# Patient Record
Sex: FEMALE | Race: WHITE | NOT HISPANIC OR LATINO
[De-identification: names, ages, dates, MRNs, and addresses within clinical notes are randomized per-mention and may not be internally consistent; named-entity substitution may affect disease eponyms.]

---
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
## 2024-12-13 ENCOUNTER — INPATIENT (INPATIENT)
Facility: HOSPITAL | Age: 19
LOS: 1 days | Discharge: HOME | DRG: 951 | End: 2024-12-15
Attending: OBSTETRICS & GYNECOLOGY | Admitting: OBSTETRICS & GYNECOLOGY
Payer: MEDICAID

## 2024-12-13 ENCOUNTER — TRANSCRIPTION ENCOUNTER (OUTPATIENT)
Age: 19
End: 2024-12-13

## 2024-12-13 VITALS — OXYGEN SATURATION: 99 % | HEART RATE: 93 BPM

## 2024-12-13 DIAGNOSIS — O26.899 OTHER SPECIFIED PREGNANCY RELATED CONDITIONS, UNSPECIFIED TRIMESTER: ICD-10-CM

## 2024-12-13 DIAGNOSIS — Z3A.39 39 WEEKS GESTATION OF PREGNANCY: ICD-10-CM

## 2024-12-13 DIAGNOSIS — Z34.80 ENCOUNTER FOR SUPERVISION OF OTHER NORMAL PREGNANCY, UNSPECIFIED TRIMESTER: ICD-10-CM

## 2024-12-13 LAB
ALBUMIN SERPL ELPH-MCNC: 3.6 G/DL — SIGNIFICANT CHANGE UP (ref 3.3–5)
ALP SERPL-CCNC: 252 U/L — HIGH (ref 40–120)
ALT FLD-CCNC: 11 U/L — SIGNIFICANT CHANGE UP (ref 10–45)
ANION GAP SERPL CALC-SCNC: 14 MMOL/L — SIGNIFICANT CHANGE UP (ref 5–17)
APPEARANCE UR: CLEAR — SIGNIFICANT CHANGE UP
AST SERPL-CCNC: 17 U/L — SIGNIFICANT CHANGE UP (ref 10–40)
BASOPHILS # BLD AUTO: 0.05 K/UL — SIGNIFICANT CHANGE UP (ref 0–0.2)
BASOPHILS NFR BLD AUTO: 0.7 % — SIGNIFICANT CHANGE UP (ref 0–2)
BILIRUB SERPL-MCNC: 0.1 MG/DL — LOW (ref 0.2–1.2)
BILIRUB UR-MCNC: NEGATIVE — SIGNIFICANT CHANGE UP
BLD GP AB SCN SERPL QL: NEGATIVE — SIGNIFICANT CHANGE UP
BUN SERPL-MCNC: 14 MG/DL — SIGNIFICANT CHANGE UP (ref 7–23)
CALCIUM SERPL-MCNC: 9.3 MG/DL — SIGNIFICANT CHANGE UP (ref 8.4–10.5)
CHLORIDE SERPL-SCNC: 104 MMOL/L — SIGNIFICANT CHANGE UP (ref 96–108)
CO2 SERPL-SCNC: 19 MMOL/L — LOW (ref 22–31)
COLOR SPEC: YELLOW — SIGNIFICANT CHANGE UP
CREAT ?TM UR-MCNC: 28 MG/DL — SIGNIFICANT CHANGE UP
CREAT SERPL-MCNC: 0.63 MG/DL — SIGNIFICANT CHANGE UP (ref 0.5–1.3)
DIFF PNL FLD: NEGATIVE — SIGNIFICANT CHANGE UP
EGFR: 130 ML/MIN/1.73M2 — SIGNIFICANT CHANGE UP
EOSINOPHIL # BLD AUTO: 0.11 K/UL — SIGNIFICANT CHANGE UP (ref 0–0.5)
EOSINOPHIL NFR BLD AUTO: 1.5 % — SIGNIFICANT CHANGE UP (ref 0–6)
GLUCOSE SERPL-MCNC: 71 MG/DL — SIGNIFICANT CHANGE UP (ref 70–99)
GLUCOSE UR QL: NEGATIVE MG/DL — SIGNIFICANT CHANGE UP
HCT VFR BLD CALC: 34 % — LOW (ref 34.5–45)
HGB BLD-MCNC: 10.4 G/DL — LOW (ref 11.5–15.5)
HIV 1 & 2 AB SERPL IA.RAPID: SIGNIFICANT CHANGE UP
IMM GRANULOCYTES NFR BLD AUTO: 0.4 % — SIGNIFICANT CHANGE UP (ref 0–0.9)
KETONES UR-MCNC: NEGATIVE MG/DL — SIGNIFICANT CHANGE UP
LDH SERPL L TO P-CCNC: 158 U/L — SIGNIFICANT CHANGE UP (ref 50–242)
LEUKOCYTE ESTERASE UR-ACNC: NEGATIVE — SIGNIFICANT CHANGE UP
LYMPHOCYTES # BLD AUTO: 2.47 K/UL — SIGNIFICANT CHANGE UP (ref 1–3.3)
LYMPHOCYTES # BLD AUTO: 33.3 % — SIGNIFICANT CHANGE UP (ref 13–44)
MCHC RBC-ENTMCNC: 23.3 PG — LOW (ref 27–34)
MCHC RBC-ENTMCNC: 30.6 G/DL — LOW (ref 32–36)
MCV RBC AUTO: 76.1 FL — LOW (ref 80–100)
MONOCYTES # BLD AUTO: 0.57 K/UL — SIGNIFICANT CHANGE UP (ref 0–0.9)
MONOCYTES NFR BLD AUTO: 7.7 % — SIGNIFICANT CHANGE UP (ref 2–14)
NEUTROPHILS # BLD AUTO: 4.18 K/UL — SIGNIFICANT CHANGE UP (ref 1.8–7.4)
NEUTROPHILS NFR BLD AUTO: 56.4 % — SIGNIFICANT CHANGE UP (ref 43–77)
NITRITE UR-MCNC: NEGATIVE — SIGNIFICANT CHANGE UP
NRBC # BLD: 0 /100 WBCS — SIGNIFICANT CHANGE UP (ref 0–0)
PH UR: 6.5 — SIGNIFICANT CHANGE UP (ref 5–8)
PLATELET # BLD AUTO: 189 K/UL — SIGNIFICANT CHANGE UP (ref 150–400)
POTASSIUM SERPL-MCNC: 4 MMOL/L — SIGNIFICANT CHANGE UP (ref 3.5–5.3)
POTASSIUM SERPL-SCNC: 4 MMOL/L — SIGNIFICANT CHANGE UP (ref 3.5–5.3)
PROT ?TM UR-MCNC: 16 MG/DL — HIGH (ref 0–12)
PROT SERPL-MCNC: 6.3 G/DL — SIGNIFICANT CHANGE UP (ref 6–8.3)
PROT UR-MCNC: SIGNIFICANT CHANGE UP MG/DL
PROT/CREAT UR-RTO: 0.6 RATIO — HIGH (ref 0–0.2)
RBC # BLD: 4.47 M/UL — SIGNIFICANT CHANGE UP (ref 3.8–5.2)
RBC # FLD: 14.5 % — SIGNIFICANT CHANGE UP (ref 10.3–14.5)
RH IG SCN BLD-IMP: POSITIVE — SIGNIFICANT CHANGE UP
SODIUM SERPL-SCNC: 137 MMOL/L — SIGNIFICANT CHANGE UP (ref 135–145)
SP GR SPEC: 1.01 — SIGNIFICANT CHANGE UP (ref 1–1.03)
T PALLIDUM AB TITR SER: NEGATIVE — SIGNIFICANT CHANGE UP
URATE SERPL-MCNC: 6.9 MG/DL — SIGNIFICANT CHANGE UP (ref 2.5–7)
UROBILINOGEN FLD QL: 0.2 MG/DL — SIGNIFICANT CHANGE UP (ref 0.2–1)
WBC # BLD: 7.41 K/UL — SIGNIFICANT CHANGE UP (ref 3.8–10.5)
WBC # FLD AUTO: 7.41 K/UL — SIGNIFICANT CHANGE UP (ref 3.8–10.5)

## 2024-12-13 RX ORDER — CHLORHEXIDINE GLUCONATE 1.2 MG/ML
1 RINSE ORAL DAILY
Refills: 0 | Status: DISCONTINUED | OUTPATIENT
Start: 2024-12-13 | End: 2024-12-13

## 2024-12-13 RX ORDER — .BETA.-CAROTENE, SODIUM ACETATE, ASCORBIC ACID, CHOLECALCIFEROL, .ALPHA.-TOCOPHEROL ACETATE, DL-, THIAMINE MONONITRATE, RIBOFLAVIN, NIACINAMIDE, PYRIDOXINE HYDROCHLORIDE, FOLIC ACID, CYANOCOBALAMIN, CALCIUM CARBONATE, FERROUS FUMARATE, ZINC OXIDE AND CUPRIC OXIDE 2000; 2000; 120; 400; 22; 1.84; 3; 20; 10; 1; 12; 200; 27; 25; 2 [IU]/1; [IU]/1; MG/1; [IU]/1; MG/1; MG/1; MG/1; MG/1; MG/1; MG/1; UG/1; MG/1; MG/1; MG/1; MG/1
1 TABLET ORAL DAILY
Refills: 0 | Status: DISCONTINUED | OUTPATIENT
Start: 2024-12-13 | End: 2024-12-15

## 2024-12-13 RX ORDER — WITCH HAZEL 50 G/100ML
1 SOLUTION RECTAL EVERY 4 HOURS
Refills: 0 | Status: DISCONTINUED | OUTPATIENT
Start: 2024-12-13 | End: 2024-12-15

## 2024-12-13 RX ORDER — BENZOCAINE 10 %
1 OINTMENT (GRAM) TOPICAL EVERY 6 HOURS
Refills: 0 | Status: DISCONTINUED | OUTPATIENT
Start: 2024-12-13 | End: 2024-12-15

## 2024-12-13 RX ORDER — PRAMOXINE HYDROCHLORIDE 1 MG/ML
1 LOTION TOPICAL EVERY 4 HOURS
Refills: 0 | Status: DISCONTINUED | OUTPATIENT
Start: 2024-12-13 | End: 2024-12-15

## 2024-12-13 RX ORDER — TETANUS TOXOID, REDUCED DIPHTHERIA TOXOID AND ACELLULAR PERTUSSIS VACCINE, ADSORBED 5; 2.5; 8; 8; 2.5 [IU]/.5ML; [IU]/.5ML; UG/.5ML; UG/.5ML; UG/.5ML
0.5 SUSPENSION INTRAMUSCULAR ONCE
Refills: 0 | Status: DISCONTINUED | OUTPATIENT
Start: 2024-12-13 | End: 2024-12-15

## 2024-12-13 RX ORDER — 0.9 % SODIUM CHLORIDE 0.9 %
1000 INTRAVENOUS SOLUTION INTRAVENOUS
Refills: 0 | Status: DISCONTINUED | OUTPATIENT
Start: 2024-12-13 | End: 2024-12-15

## 2024-12-13 RX ORDER — SIMETHICONE 125 MG
80 CAPSULE ORAL EVERY 4 HOURS
Refills: 0 | Status: DISCONTINUED | OUTPATIENT
Start: 2024-12-13 | End: 2024-12-15

## 2024-12-13 RX ORDER — OXYCODONE HYDROCHLORIDE 30 MG/1
5 TABLET ORAL ONCE
Refills: 0 | Status: DISCONTINUED | OUTPATIENT
Start: 2024-12-13 | End: 2024-12-15

## 2024-12-13 RX ORDER — DIPHENHYDRAMINE HCL 25 MG
25 CAPSULE ORAL EVERY 6 HOURS
Refills: 0 | Status: DISCONTINUED | OUTPATIENT
Start: 2024-12-13 | End: 2024-12-15

## 2024-12-13 RX ORDER — KETOROLAC TROMETHAMINE 30 MG/ML
30 INJECTION INTRAMUSCULAR; INTRAVENOUS ONCE
Refills: 0 | Status: DISCONTINUED | OUTPATIENT
Start: 2024-12-13 | End: 2024-12-13

## 2024-12-13 RX ORDER — 0.9 % SODIUM CHLORIDE 0.9 %
1000 INTRAVENOUS SOLUTION INTRAVENOUS
Refills: 0 | Status: DISCONTINUED | OUTPATIENT
Start: 2024-12-13 | End: 2024-12-13

## 2024-12-13 RX ORDER — ACETAMINOPHEN 500MG 500 MG/1
3 TABLET, COATED ORAL
Qty: 0 | Refills: 0 | DISCHARGE
Start: 2024-12-13

## 2024-12-13 RX ORDER — TRISODIUM CITRATE DIHYDRATE AND CITRIC ACID MONOHYDRATE 500; 334 MG/5ML; MG/5ML
15 SOLUTION ORAL EVERY 6 HOURS
Refills: 0 | Status: DISCONTINUED | OUTPATIENT
Start: 2024-12-13 | End: 2024-12-13

## 2024-12-13 RX ORDER — ACETAMINOPHEN 500MG 500 MG/1
975 TABLET, COATED ORAL
Refills: 0 | Status: DISCONTINUED | OUTPATIENT
Start: 2024-12-13 | End: 2024-12-15

## 2024-12-13 RX ORDER — DIBUCAINE 1 %
1 OINTMENT (GRAM) TOPICAL EVERY 6 HOURS
Refills: 0 | Status: DISCONTINUED | OUTPATIENT
Start: 2024-12-13 | End: 2024-12-15

## 2024-12-13 RX ORDER — IBUPROFEN 200 MG
1 TABLET ORAL
Qty: 0 | Refills: 0 | DISCHARGE
Start: 2024-12-13

## 2024-12-13 RX ORDER — SODIUM CHLORIDE 9 MG/ML
3 INJECTION, SOLUTION INTRAMUSCULAR; INTRAVENOUS; SUBCUTANEOUS EVERY 8 HOURS
Refills: 0 | Status: DISCONTINUED | OUTPATIENT
Start: 2024-12-13 | End: 2024-12-15

## 2024-12-13 RX ORDER — LANOLIN 72 %
1 OINTMENT (GRAM) TOPICAL EVERY 6 HOURS
Refills: 0 | Status: DISCONTINUED | OUTPATIENT
Start: 2024-12-13 | End: 2024-12-15

## 2024-12-13 RX ORDER — IBUPROFEN 200 MG
600 TABLET ORAL EVERY 6 HOURS
Refills: 0 | Status: DISCONTINUED | OUTPATIENT
Start: 2024-12-13 | End: 2024-12-15

## 2024-12-13 RX ORDER — IBUPROFEN 200 MG
600 TABLET ORAL EVERY 6 HOURS
Refills: 0 | Status: COMPLETED | OUTPATIENT
Start: 2024-12-13 | End: 2025-11-11

## 2024-12-13 RX ORDER — HYDROCORTISONE BUTYRATE 0.1 %
1 CREAM (GRAM) TOPICAL EVERY 6 HOURS
Refills: 0 | Status: DISCONTINUED | OUTPATIENT
Start: 2024-12-13 | End: 2024-12-15

## 2024-12-13 RX ORDER — OXYCODONE HYDROCHLORIDE 30 MG/1
5 TABLET ORAL
Refills: 0 | Status: DISCONTINUED | OUTPATIENT
Start: 2024-12-13 | End: 2024-12-15

## 2024-12-13 RX ADMIN — Medication 167 MILLIUNIT(S)/MIN: at 15:11

## 2024-12-13 RX ADMIN — Medication 125 MILLILITER(S): at 23:23

## 2024-12-13 RX ADMIN — ACETAMINOPHEN 500MG 975 MILLIGRAM(S): 500 TABLET, COATED ORAL at 18:09

## 2024-12-13 RX ADMIN — OXYCODONE HYDROCHLORIDE 5 MILLIGRAM(S): 30 TABLET ORAL at 23:56

## 2024-12-13 RX ADMIN — KETOROLAC TROMETHAMINE 30 MILLIGRAM(S): 30 INJECTION INTRAMUSCULAR; INTRAVENOUS at 15:24

## 2024-12-13 RX ADMIN — Medication 125 MILLILITER(S): at 06:25

## 2024-12-13 RX ADMIN — Medication 10 UNIT(S): at 14:20

## 2024-12-13 RX ADMIN — Medication 600 MILLIGRAM(S): at 21:02

## 2024-12-13 RX ADMIN — Medication 0.2 MILLIGRAM(S): at 14:23

## 2024-12-13 RX ADMIN — Medication 600 MILLIGRAM(S): at 22:02

## 2024-12-13 RX ADMIN — ACETAMINOPHEN 500MG 975 MILLIGRAM(S): 500 TABLET, COATED ORAL at 23:52

## 2024-12-13 NOTE — OB PROVIDER H&P - NS_OBGYNHISTORY_OBGYN_ALL_OB_FT
– PNC: Denies prenatal issues. GBS negative. EFW 3000g extrapolated from sono in office 3 weeks ago.   – OBHx: Primigravida. Denies terminations, miscarriages, ectopic pregnancies  – GynHx: Denies fibroids, ovarian cysts, abnormal pap smears, STI

## 2024-12-13 NOTE — OB PROVIDER H&P - ATTENDING COMMENTS
19F  39wks gestational age admitted for labor at term and SROM at 3am. Breech presentation. pnc record obtained. found to be GBS positive. will initiate treatment with ancef.  fetal tracing reassuring cat 1  consent obtained. the risks associated with the cs were dw the pt including but not limiting to the risk to possible injury to bowel, bladder, ureter, and possible post op complications including to infection, wound dehiscense, thromboembolic events. all of her questions were answered. consent was signed.   Dr. Rios 19F  39wks gestational age admitted for labor at term and SROM at 3am.   efm: cat 1  efw 3000 gm  a/p  iup at 39 weeks  srom  early labor  gbs neg  h/o scoliosis - anesthesia consult as pt may request an epidural for pain management.  expectant management for now  Dr. Rios

## 2024-12-13 NOTE — PROVIDER CONTACT NOTE (OTHER) - ACTION/TREATMENT ORDERED:
As per MD, continue to monitor Q4 VS, MD to discuss possible PEC medication with patient.
Straight cath ordered

## 2024-12-13 NOTE — PROVIDER CONTACT NOTE (OTHER) - ASSESSMENT
Bladder scan showed 215 mL of fluid in her bladder but patient saying she feels fullness and is uncomfortable. Uterus firm and midline, bleeding moderate but labia extremely swollen.
Vitals on admission 150/98, O2Sat 98%, HR 61, temp 98.1.  Patient denies SOB, blurry vision, headache, N/V

## 2024-12-13 NOTE — DISCHARGE NOTE OB - PATIENT PORTAL LINK FT
You can access the FollowMyHealth Patient Portal offered by [facility] by registering at the following website: [email or web address]. By joining [facility]’s FollowMyHealth portal, you will also be able to view your health information using other applications (apps) compatible with our system.

## 2024-12-13 NOTE — OB PROVIDER H&P - PROBLEM SELECTOR PLAN 1
Plan  1. Admit to L&D. Routine Labs. IVF.  2. Expectant management  3. Fetus: cat 1 tracing. VTX. EFW 3000g extrapolated from sono in office 3 weeks ago. Continuous EFM. Sono. No concerns.  4. Prenatal issues: none  5. GBS negative  6. Pain: IV pain meds/epidural PRN (of note, history of scoliosis)    Discussed with Dr. Gabriel Celeste PA-C

## 2024-12-13 NOTE — PROVIDER CONTACT NOTE (OTHER) - RECOMMENDATIONS
Will straight cath the patient.
As per MD, continue to monitor Q4 VS, MD to discuss possible PEC medication with patient.

## 2024-12-13 NOTE — OB PROVIDER H&P - NSHPREVIEWOFSYSTEMS_GEN_ALL_CORE
Pt reports sleeping in bed when she experienced a gush of clear fluid with intermittent trickling since. Pt states contractions began shortly after, occurring now every 10 min, and 8/10 pain. Pt denies desire for pain control at this time. +FM. -VB. Pt denies any chest pain, palpitations, headaches, epigastric pain, visual disturbances, RUQ pain, or any other concerns.

## 2024-12-13 NOTE — OB PROVIDER H&P - ASSESSMENT
I called Maira to schedule a new patient consultation with [facility] in response to a referral sent to our department  I left a voicemail making patient aware of their referral and instructing them to call [facility] at [phone] to schedule  Another attempt to schedule will be made to schedule patient  Assessment  19F  39wks gestational age admitted for labor at term and SROM at 3am.

## 2024-12-13 NOTE — OB PROVIDER H&P - HISTORY OF PRESENT ILLNESS
PA Admission H&P    Subjective  HPI: 19F  39wks gestational age presents for ROR/ROL. Pt reports sleeping in bed when she experienced a gush of clear fluid with intermittent trickling since. Pt states contractions began shortly after, occurring now every 10 min, and 8/10 pain. Pt denies desire for pain control at this time. +FM. -VB. Pt denies any chest pain, palpitations, headaches, epigastric pain, visual disturbances, RUQ pain, or any other concerns. Of note, pt was previously found to be 3cm dilated in the office 2 days ago.     – PNC: Denies prenatal issues. GBS negative. EFW 3000g extrapolated from sono in office 3 weeks ago.   – OBHx: Primigravida. Denies terminations, miscarriages, ectopic pregnancies  – GynHx: Denies fibroids, ovarian cysts, abnormal pap smears, STI  – PMH: Scoliosis ~38deg angle in lumbar spine (no anesthesia consult during pregnancy). Denies asthma, thyroid disorders, renal/liver disease, bleeding/clotting disorders  – PSH: Nikolai  – Psych: Denies  – Social: Denies T/E/D  – Meds: PNV  – Allergies: NKDA  – Will accept blood transfusions? Yes

## 2024-12-13 NOTE — OB RN DELIVERY SUMMARY - NS_FORCEPSINDICAT_OBGYN_ALL_OB
You were seen in the ED for bleeding. You had lab work and an ultrasound here which showed no acute pathology which required admission. You were seen by OBGYN. Please follow up with your OBGYN at your scheduled appointment. You had a blood transfusion while you were in the hospital.     If you develop worsening bleeding or any new or worse symptoms please return to the emergency department.     Please take your medication as indicated.
Failure to progress

## 2024-12-13 NOTE — OB PROVIDER DELIVERY SUMMARY - NSPROVIDERDELIVERYNOTE_OBGYN_ALL_OB_FT
Pt became fully dilated and desired to push. After 3 hours of pushing, additional 1 hour of pushing and spinning babies maneuvers with minimal advancement.   Pt counseled regarding forceps assisted delivery vs  delivery. Pt consented for forceps assisted vaginal delivery.   Leukhardt juan david forceps applied in the classical way at +2 station, ALLIE/OA position with mild asynclitism.  RML performed and after 3 pulls, successful delivery of the fetal head. Shoulders and body delivered easily. Infant was passed to mother.  Cord clamping was delayed 1 minute. Cord was cut.  Placenta delivered spontaneously intact. Uterine massage was performed and pitocin was given. Uterine atony noted, resolved with bimanual massage, IM methergine and IM pitocin.   Fundus was firm. RML repaired with vicryl.  Good hemostasis was noted.  Count correct x2. Mother and baby resting comfortably.   Pt became fully dilated and desired to push. After 3 hours of pushing, additional 1 hour of pushing and spinning babies maneuvers with minimal advancement.   Pt counseled regarding forceps assisted delivery vs  delivery. Pt consented for forceps assisted vaginal delivery. Patient signed separate consent for the forceps, episiotomy and possible  section.  Luikhart Sampson forceps applied in the classical way at +2 station, ALLIE/OA position with mild asynclitism.  RML performed and after 1 pull, successful delivery of the fetal head. Shoulders and body delivered easily. Infant was passed to mother.  Cord clamping was delayed 1 minute. NICU in attendance per forceps guidelines. Cord was cut.  Placenta delivered spontaneously intact. Uterine massage was performed and pitocin was given. Uterine atony noted, resolved with bimanual massage, IM methergine and IM pitocin.   Fundus was firm. RML repaired with 2-0 and 3-0 vicryl.  Good hemostasis was noted.  Count correct x2. Mother and baby resting comfortably.

## 2024-12-13 NOTE — OB RN DELIVERY SUMMARY - NSSELHIDDEN_OBGYN_ALL_OB_FT
[NS_DeliveryAttending1_OBGYN_ALL_OB_FT:[id number]],[NS_DeliveryAssist1_OBGYN_ALL_OB_FT:[id number]],[NS_DeliveryRN_OBGYN_ALL_OB_FT:[id number]],[NS_DeliveryAssist2_OBGYN_ALL_OB_FT:[id number]]

## 2024-12-13 NOTE — OB PROVIDER LABOR PROGRESS NOTE - NS_SUBJECTIVE/OBJECTIVE_OBGYN_ALL_OB_FT
Pt seen and chart reviewed.   at term admitted overnight S/P SROM.  PNC w no complications.  Pt currently reports rectal pressure
Pt seen and examined at bedside.

## 2024-12-13 NOTE — DISCHARGE NOTE OB - FINANCIAL ASSISTANCE
[facility] provides services at a reduced cost to those who are determined to be eligible through [facility]’s financial assistance program. Information regarding [facility]’s financial assistance program can be found by going to [email or web address] or by calling [phone].

## 2024-12-13 NOTE — OB NEONATOLOGY/PEDIATRICIAN DELIVERY SUMMARY - NSPEDSNEONOTESA_OBGYN_ALL_OB_FT
Called by OB to attend vaginal delivery due to forceps. Baby is  product of a 39.0 week gestation born to a G 1 P0  20 year old female. Maternal labs include Blood Type  O+ , HIV  , RPR -, Hep B[ +/- ], GBS neg, rest is unremarkable.  ROM at  0300, approximately 11 hrs.  Resuscitation included: d/w/s/s delayed cord clamping performed. Apgars were: 8/9 EOS score 0.1 Admit to NBN. Called by OB to attend vaginal delivery due to forceps. Baby is  product of a 39.0 week gestation born to a G 1 P0  20 year old female. Maternal labs include Blood Type  O+ , HIV-  , RPR -, Hep B pending, GBS neg, ROM at  0300, approximately 11 hrs.  Resuscitation included: d/w/s/s delayed cord clamping performed. Apgars were: 8/9 EOS score 0.1 Admit to NBN. Called by OB to attend vaginal delivery due to forceps. Baby is  product of a 39.0 week gestation born to a G 1 P0  20 year old female. Maternal labs include Blood Type  O+ , HIV-  , RPR -, Hep B neg, GBS neg, ROM at  0300, approximately 11 hrs.  Resuscitation included: d/w/s/s delayed cord clamping performed. Apgars were: 8/9. Admit to NBN.

## 2024-12-13 NOTE — DISCHARGE NOTE OB - PLAN OF CARE
After discharge, please stay on pelvic rest for 6 weeks, meaning no sexual intercourse, no tampons and no douching.  No driving for 2 weeks as women can loose a lot of blood during delivery and there is a possibility of being lightheaded/fainting.  No lifting objects heavier than baby for two weeks.  Expect to have vaginal bleeding/spotting for up to six weeks.  The bleeding should get lighter and more white/light brown with time.  For bleeding soaking more than a pad an hour or passing clots greater than the size of your fist, come in to the emergency department.    Follow up in clinic in 6 weeks. After discharge, please stay on pelvic rest for 6 weeks, meaning no sexual intercourse, no tampons and no douching.  No driving for 2 weeks as women can loose a lot of blood during delivery and there is a possibility of being lightheaded/fainting.  No lifting objects heavier than baby for two weeks.  Expect to have vaginal bleeding/spotting for up to six weeks.  The bleeding should get lighter and more white/light brown with time.  For bleeding soaking more than a pad an hour or passing clots greater than the size of your fist, come in to the emergency department.    Follow up in clinic in 6 weeks.    attending note  pt was seen and evaluated and agree with the above  stable for discharge home

## 2024-12-13 NOTE — OB RN PATIENT PROFILE - FALL HARM RISK - UNIVERSAL INTERVENTIONS
Bed in lowest position, wheels locked, appropriate side rails in place/Call bell, personal items and telephone in reach/Instruct patient to call for assistance before getting out of bed or chair/Non-slip footwear when patient is out of bed/[place] to call system/Physically safe environment - no spills, clutter or unnecessary equipment/Purposeful Proactive Rounding/Room/bathroom lighting operational, light cord in reach

## 2024-12-13 NOTE — OB PROVIDER H&P - NSTOBACCOSCREENHP_GEN_A_NCS
----- Message from Abelardo Dias MA sent at 6/12/2017  9:42 AM CDT -----  Contact: self - [phone] or [phone]   Patient is requesting to speak with Dr Prado regarding problems with diverticulitis and a possible appt today. Please call. Thanks!   
Assist in appt   
Please advise message below.   
Spoke w pt; schedule appointment.   
No

## 2024-12-13 NOTE — DISCHARGE NOTE OB - CARE PLAN
Principal Discharge DX:	Forceps delivery  Assessment and plan of treatment:	After discharge, please stay on pelvic rest for 6 weeks, meaning no sexual intercourse, no tampons and no douching.  No driving for 2 weeks as women can loose a lot of blood during delivery and there is a possibility of being lightheaded/fainting.  No lifting objects heavier than baby for two weeks.  Expect to have vaginal bleeding/spotting for up to six weeks.  The bleeding should get lighter and more white/light brown with time.  For bleeding soaking more than a pad an hour or passing clots greater than the size of your fist, come in to the emergency department.    Follow up in clinic in 6 weeks.   1 Principal Discharge DX:	Forceps delivery  Assessment and plan of treatment:	After discharge, please stay on pelvic rest for 6 weeks, meaning no sexual intercourse, no tampons and no douching.  No driving for 2 weeks as women can loose a lot of blood during delivery and there is a possibility of being lightheaded/fainting.  No lifting objects heavier than baby for two weeks.  Expect to have vaginal bleeding/spotting for up to six weeks.  The bleeding should get lighter and more white/light brown with time.  For bleeding soaking more than a pad an hour or passing clots greater than the size of your fist, come in to the emergency department.    Follow up in clinic in 6 weeks.    attending note  pt was seen and evaluated and agree with the above  stable for discharge home

## 2024-12-13 NOTE — DISCHARGE NOTE OB - HOSPITAL COURSE
Patient had an uncomplicated FAVD followed by an uncomplicated postpartum course.  , hct ***->***. On postpartum day 2, patient was discharged home in stable condition, voiding spontaneously and with normal vital signs.  Patient had FAVD complicated by an RML and R labial hematoma, followed by an uncomplicated postpartum course.  , hct 34.0->29.0. On postpartum day 2, patient was discharged home in stable condition, voiding spontaneously and with normal vital signs.

## 2024-12-13 NOTE — OB RN TRIAGE NOTE - NS_TRIAGEMEDICALSCREENINGPERFORMEDBY_OBGYN_ALL_OB_FT
[facility] - Internal Medicine  [address] [address]  Phone: [phone]                  Brenna Thompson   2017 10:00 AM   Office Visit    Description:  Female : 1952   Provider:  Phil Araujo DO   Department:  [facility] Internal Medicine           Reason for Visit     Follow-up           Diagnoses this Visit        Comments    Type 2 diabetes mellitus with microalbuminuria, with long-term current use of insulin    -  Primary     Hypertension associated with diabetes         Abnormal mammogram                To Do List           Future Appointments        Provider Department Dept Phone    2017 9:10 AM [facility] LABORATORY [facility] [phone]    2017 9:00 AM Phil Araujo DO [facility] [phone]      Goals (5 Years of Data)     None      Follow-Up and Disposition     Return in about 3 months (around 2017).      [facility]     [facility] Care [facility] -  Assistance  Registered nurses in the [facility] provide clinical advisement, health education, appointment booking, and other advisory services.  Call for this free service at [phone].             Medications           Message regarding Medications     Verify the changes and/or additions to your medication regime listed below are the same as discussed with your clinician today.  If any of these changes or additions are incorrect, please notify your healthcare provider.             Verify that the below list of medications is an accurate representation of the medications you are currently taking.  If none reported, the list may be blank. If incorrect, please contact your healthcare provider. Carry this list with you in case of emergency.           Current Medications     ASPIRIN LOW DOSE ORAL 81 mg.    atorvastatin (LIPITOR) 40 MG tablet Take 1 tablet (40 mg total) by mouth once daily.    blood sugar diagnostic Strp Inject 1  "strip into the skin every evening. Check blood sugar once daily.    blood-glucose meter kit Use as instructed    cholecalciferol, vitamin D3, 400 unit Tab 1 tablet.    insulin detemir (LEVEMIR FLEXTOUCH) 100 unit/mL (3 mL) SubQ InPn pen Inject 10 Units into the skin every evening.    loratadine (CLARITIN) 10 mg tablet 10 mg.    metformin (GLUCOPHAGE-XR) 500 MG 24 hr tablet Take 2 tablets (1,000 mg total) by mouth daily with breakfast.    pen needle, diabetic 31 gauge x 3/16" Ndle Inject 1 Units into the skin every evening. Inject 1 BID    triamterene-hydrochlorothiazide 37.5-25 mg (DYAZIDE) 37.5-25 mg per capsule Take 1 capsule by mouth every morning.           Clinical Reference Information           Vital Signs - Last Recorded  Most recent update: 1/12/2017 10:08 AM by Sandra Hernandez LPN    BP Pulse Temp Resp Ht    (!) 146/68 (BP Location: Left arm, Patient Position: Sitting, BP Method: Automatic) 75 98.6 °F (37 °C) (Tympanic) 20 5' 1.2" (1.554 m)    Wt LMP SpO2 BMI    79.6 kg (175 lb 7.8 oz) 02/08/2016 99% 32.94 kg/m2      Blood Pressure          Most Recent Value    BP  (!)  146/68      Allergies as of 1/12/2017     Ace Inhibitors    Sulfamethoxazole-trimethoprim      Immunizations Administered on Date of Encounter - 1/12/2017     Name Date Dose VIS Date Route    Influenza - High Dose 1/12/2017 0.5 mL 8/7/2015 Intramuscular      Orders Placed During Today's Visit      Normal Orders This Visit    Influenza - High Dose (65+) (PF) (IM)     Future Labs/Procedures Expected by Expires    Basic metabolic panel  2/21/2017 (Approximate) 1/12/2018    Hemoglobin A1c  2/21/2017 (Approximate) 1/12/2018      [facility] Sign-Up     Activating your [facility] account is as easy as 1-2-3!     1) Visit [email or web address], select Sign Up Now, enter this activation code and your date of birth, then select Next.  Activation code not generated  Current Patient Portal Status: Account disabled      2) Create a username and password " to use when you visit [facility] in the future and select a security question in case you lose your password and select Next.    3) Enter your e-mail address and click Sign Up!    Additional Information  If you have questions, please e-mail [email or web address] or call [phone] to talk to our [facility] staff. Remember, [facility] is NOT to be used for urgent needs. For medical emergencies, dial 911.         Smoking Cessation     If you would like to quit smoking:   You may be eligible for free services if you are a [place] resident and started smoking cigarettes before September 1, 1988.  Call the Smoking Cessation Trust (SCT) toll free at (311) 523-9622 or (749) 418-2112.   Call 3-800-QUIT-NOW if you do not meet the above criteria.             NEETA Celeste

## 2024-12-13 NOTE — OB PROVIDER H&P - LABOR: CERVICAL EFFACEMENT
38w4d      Started feeling contractions at about noon  3-7 minutes apart  Last for about 30 seconds  Spotting/bloody show  Lower pelvic pain  No LOF  Normal fetal movement that she can tell  Feels like they are getting stronger    Covid positive on   Asymptomatic   Advised to be masked.   does NOT have covid.    Sending to L&D for eval.    Amber Ramirez RN    
Greater than 75%

## 2024-12-13 NOTE — PROVIDER CONTACT NOTE (OTHER) - SITUATION
Primip postpartum NS VD day zero, new admission, , RML with hemorroids, PC ratio 0.6
Day of delivery primip  complaining of feeling her bladder is "full" and cannot void.

## 2024-12-13 NOTE — OB PROVIDER DELIVERY SUMMARY - NSSELHIDDEN_OBGYN_ALL_OB_FT
[NS_DeliveryAttending1_OBGYN_ALL_OB_FT:[email or web address]],[NS_DeliveryAssist1_OBGYN_ALL_OB_FT:[email or web address]=]

## 2024-12-13 NOTE — OB RN TRIAGE NOTE - FALL HARM RISK - UNIVERSAL INTERVENTIONS
Bed in lowest position, wheels locked, appropriate side rails in place/Call bell, personal items and telephone in reach/Instruct patient to call for assistance before getting out of bed or chair/Non-slip footwear when patient is out of bed/[place] to call system/Physically safe environment - no spills, clutter or unnecessary equipment/Purposeful Proactive Rounding/Room/bathroom lighting operational, light cord in reach

## 2024-12-13 NOTE — OB PROVIDER H&P - NSHPPHYSICALEXAM_GEN_ALL_CORE
Objective  – VS  T(C): 36.9 (12-13-24 @ 04:54)  HR: 95 (12-13-24 @ 05:31)  BP: 136/93 (12-13-24 @ 05:18)  RR: 17 (12-13-24 @ 04:54)  SpO2: 99% (12-13-24 @ 05:31)  – PE:   General: NAD  CV: RRR  Pulm: breathing comfortably on RA  Abd: gravid, nontender  Extr: moving all extremities with ease  – Sterile speculum exam: positive pooling (clear fluid), positive nitrazine test, negative bleeding  – VE: 4/90/-2  – FHT: 135/moderate variability/+accels/-decels  – [place]: q3-5 min  – EFW: 3000g extrapolated from sono in office 3 weeks ago  – Sono: vertex

## 2024-12-13 NOTE — OB PROVIDER H&P - NSLOWPPHRISK_OBGYN_A_OB
No previous uterine incision/Retana Pregnancy/Less than or equal to 4 previous vaginal births/No known bleeding disorder

## 2024-12-13 NOTE — DISCHARGE NOTE OB - CARE PROVIDER_API CALL
Court Barnes  Obstetrics and Gynecology  [address] [place] [address], [address]  [place] [address]  Phone: [phone]  Fax: [phone]  Follow Up Time: Routine

## 2024-12-14 LAB
ALBUMIN SERPL ELPH-MCNC: 3.1 G/DL — LOW (ref 3.3–5)
ALP SERPL-CCNC: 190 U/L — HIGH (ref 40–120)
ALT FLD-CCNC: 15 U/L — SIGNIFICANT CHANGE UP (ref 10–45)
ANION GAP SERPL CALC-SCNC: 13 MMOL/L — SIGNIFICANT CHANGE UP (ref 5–17)
AST SERPL-CCNC: 29 U/L — SIGNIFICANT CHANGE UP (ref 10–40)
BILIRUB SERPL-MCNC: 0.1 MG/DL — LOW (ref 0.2–1.2)
BUN SERPL-MCNC: 19 MG/DL — SIGNIFICANT CHANGE UP (ref 7–23)
CALCIUM SERPL-MCNC: 8.8 MG/DL — SIGNIFICANT CHANGE UP (ref 8.4–10.5)
CHLORIDE SERPL-SCNC: 106 MMOL/L — SIGNIFICANT CHANGE UP (ref 96–108)
CO2 SERPL-SCNC: 18 MMOL/L — LOW (ref 22–31)
CREAT SERPL-MCNC: 0.96 MG/DL — SIGNIFICANT CHANGE UP (ref 0.5–1.3)
EGFR: 87 ML/MIN/1.73M2 — SIGNIFICANT CHANGE UP
GLUCOSE SERPL-MCNC: 70 MG/DL — SIGNIFICANT CHANGE UP (ref 70–99)
HCT VFR BLD CALC: 28 % — LOW (ref 34.5–45)
HCT VFR BLD CALC: 29 % — LOW (ref 34.5–45)
HGB BLD-MCNC: 8.7 G/DL — LOW (ref 11.5–15.5)
HGB BLD-MCNC: 9 G/DL — LOW (ref 11.5–15.5)
MCHC RBC-ENTMCNC: 24.1 PG — LOW (ref 27–34)
MCHC RBC-ENTMCNC: 31 G/DL — LOW (ref 32–36)
MCV RBC AUTO: 77.7 FL — LOW (ref 80–100)
NRBC # BLD: 0 /100 WBCS — SIGNIFICANT CHANGE UP (ref 0–0)
PLATELET # BLD AUTO: 168 K/UL — SIGNIFICANT CHANGE UP (ref 150–400)
POTASSIUM SERPL-MCNC: 4.5 MMOL/L — SIGNIFICANT CHANGE UP (ref 3.5–5.3)
POTASSIUM SERPL-SCNC: 4.5 MMOL/L — SIGNIFICANT CHANGE UP (ref 3.5–5.3)
PROT SERPL-MCNC: 5.7 G/DL — LOW (ref 6–8.3)
RBC # BLD: 3.73 M/UL — LOW (ref 3.8–5.2)
RBC # FLD: 14.8 % — HIGH (ref 10.3–14.5)
SODIUM SERPL-SCNC: 137 MMOL/L — SIGNIFICANT CHANGE UP (ref 135–145)
WBC # BLD: 10.89 K/UL — HIGH (ref 3.8–10.5)
WBC # FLD AUTO: 10.89 K/UL — HIGH (ref 3.8–10.5)

## 2024-12-14 RX ADMIN — Medication 600 MILLIGRAM(S): at 03:54

## 2024-12-14 RX ADMIN — Medication 600 MILLIGRAM(S): at 08:14

## 2024-12-14 RX ADMIN — ACETAMINOPHEN 500MG 975 MILLIGRAM(S): 500 TABLET, COATED ORAL at 12:20

## 2024-12-14 RX ADMIN — ACETAMINOPHEN 500MG 975 MILLIGRAM(S): 500 TABLET, COATED ORAL at 18:00

## 2024-12-14 RX ADMIN — ACETAMINOPHEN 500MG 975 MILLIGRAM(S): 500 TABLET, COATED ORAL at 11:20

## 2024-12-14 RX ADMIN — Medication 600 MILLIGRAM(S): at 02:54

## 2024-12-14 RX ADMIN — ACETAMINOPHEN 500MG 975 MILLIGRAM(S): 500 TABLET, COATED ORAL at 05:42

## 2024-12-14 RX ADMIN — Medication 600 MILLIGRAM(S): at 21:32

## 2024-12-14 RX ADMIN — Medication 600 MILLIGRAM(S): at 15:02

## 2024-12-14 RX ADMIN — Medication 600 MILLIGRAM(S): at 09:10

## 2024-12-14 RX ADMIN — ACETAMINOPHEN 500MG 975 MILLIGRAM(S): 500 TABLET, COATED ORAL at 06:42

## 2024-12-14 RX ADMIN — Medication 600 MILLIGRAM(S): at 22:10

## 2024-12-14 RX ADMIN — ACETAMINOPHEN 500MG 975 MILLIGRAM(S): 500 TABLET, COATED ORAL at 17:06

## 2024-12-14 RX ADMIN — SODIUM CHLORIDE 3 MILLILITER(S): 9 INJECTION, SOLUTION INTRAMUSCULAR; INTRAVENOUS; SUBCUTANEOUS at 14:00

## 2024-12-14 RX ADMIN — ACETAMINOPHEN 500MG 975 MILLIGRAM(S): 500 TABLET, COATED ORAL at 00:56

## 2024-12-14 RX ADMIN — Medication 600 MILLIGRAM(S): at 16:00

## 2024-12-14 RX ADMIN — .BETA.-CAROTENE, SODIUM ACETATE, ASCORBIC ACID, CHOLECALCIFEROL, .ALPHA.-TOCOPHEROL ACETATE, DL-, THIAMINE MONONITRATE, RIBOFLAVIN, NIACINAMIDE, PYRIDOXINE HYDROCHLORIDE, FOLIC ACID, CYANOCOBALAMIN, CALCIUM CARBONATE, FERROUS FUMARATE, ZINC OXIDE AND CUPRIC OXIDE 1 TABLET(S): 2000; 2000; 120; 400; 22; 1.84; 3; 20; 10; 1; 12; 200; 27; 25; 2 TABLET ORAL at 11:20

## 2024-12-14 NOTE — PROGRESS NOTE ADULT - ATTENDING COMMENTS
18yo  now PPD#1 s/p low forceps delivery and RML episiotomy for prolonged second stage.  VS notable for normal to mild range blood pressures 110s-150s systolic, UP:C 0.6 on admission, meets criteria for preeclampsia.  Discussed diagnosis of preeclampsia with patient and associated risks.  Last two blood pressures from overnight/this morning within normal limits so will not initiate antihypertensives at this time, but will continue to monitor blood pressures closely and repeat HELLP labs this afternoon (admission HELLP labs yesterday wnl). Postpartum standpoint she is doing well, tolerating PO, sore perineum but pain well controlled, voiding without difficulty, ambulating.  No fundal tenderness or LE edema.  Baby girl is in NICU for r/o sepsis, will follow course. No concern for current maternal infection. Breastfeeding going OK, offered support/LC. Still requiring inpatient management, possible discharge tomorrow pending above course.     Cris Glez MD

## 2024-12-15 VITALS
RESPIRATION RATE: 18 BRPM | TEMPERATURE: 98 F | OXYGEN SATURATION: 99 % | HEART RATE: 68 BPM | DIASTOLIC BLOOD PRESSURE: 77 MMHG | SYSTOLIC BLOOD PRESSURE: 126 MMHG

## 2024-12-15 PROCEDURE — 59050 FETAL MONITOR W/REPORT: CPT

## 2024-12-15 PROCEDURE — 81003 URINALYSIS AUTO W/O SCOPE: CPT

## 2024-12-15 PROCEDURE — 85014 HEMATOCRIT: CPT

## 2024-12-15 PROCEDURE — 86703 HIV-1/HIV-2 1 RESULT ANTBDY: CPT

## 2024-12-15 PROCEDURE — 86780 TREPONEMA PALLIDUM: CPT

## 2024-12-15 PROCEDURE — 82570 ASSAY OF URINE CREATININE: CPT

## 2024-12-15 PROCEDURE — 84550 ASSAY OF BLOOD/URIC ACID: CPT

## 2024-12-15 PROCEDURE — 80053 COMPREHEN METABOLIC PANEL: CPT

## 2024-12-15 PROCEDURE — 83615 LACTATE (LD) (LDH) ENZYME: CPT

## 2024-12-15 PROCEDURE — 86850 RBC ANTIBODY SCREEN: CPT

## 2024-12-15 PROCEDURE — 85027 COMPLETE CBC AUTOMATED: CPT

## 2024-12-15 PROCEDURE — 86900 BLOOD TYPING SEROLOGIC ABO: CPT

## 2024-12-15 PROCEDURE — 84156 ASSAY OF PROTEIN URINE: CPT

## 2024-12-15 PROCEDURE — 85018 HEMOGLOBIN: CPT

## 2024-12-15 PROCEDURE — 86901 BLOOD TYPING SEROLOGIC RH(D): CPT

## 2024-12-15 PROCEDURE — 85025 COMPLETE CBC W/AUTO DIFF WBC: CPT

## 2024-12-15 PROCEDURE — 36415 COLL VENOUS BLD VENIPUNCTURE: CPT

## 2024-12-15 RX ADMIN — Medication 600 MILLIGRAM(S): at 05:25

## 2024-12-15 RX ADMIN — ACETAMINOPHEN 500MG 975 MILLIGRAM(S): 500 TABLET, COATED ORAL at 01:45

## 2024-12-15 RX ADMIN — ACETAMINOPHEN 500MG 975 MILLIGRAM(S): 500 TABLET, COATED ORAL at 01:11

## 2024-12-15 RX ADMIN — Medication 600 MILLIGRAM(S): at 14:00

## 2024-12-15 RX ADMIN — ACETAMINOPHEN 500MG 975 MILLIGRAM(S): 500 TABLET, COATED ORAL at 16:08

## 2024-12-15 RX ADMIN — ACETAMINOPHEN 500MG 975 MILLIGRAM(S): 500 TABLET, COATED ORAL at 10:40

## 2024-12-15 RX ADMIN — ACETAMINOPHEN 500MG 975 MILLIGRAM(S): 500 TABLET, COATED ORAL at 10:10

## 2024-12-15 RX ADMIN — Medication 600 MILLIGRAM(S): at 13:34

## 2024-12-15 NOTE — PROGRESS NOTE ADULT - SUBJECTIVE AND OBJECTIVE BOX
OB Progress Note: FAVD PPD#1    S: 20yo PPD#1 s/p FAVD. Patient feels well. Pain is well controlled. She is tolerating a regular diet and passing flatus. She is voiding spontaneously, and ambulating without difficulty. Endorses light vaginal bleeding, soaking less than 1 pad/hour. Denies headaches/dizziness, fevers/chills. Denies CP/SOB. Denies N/V.     O:  Vitals:  Vital Signs Last 24 Hrs  T(C): 36.4 (14 Dec 2024 06:36), Max: 37.1 (13 Dec 2024 11:30)  T(F): 97.6 (14 Dec 2024 06:36), Max: 98.78 (13 Dec 2024 11:30)  HR: 66 (14 Dec 2024 06:36) (56 - 156)  BP: 113/78 (14 Dec 2024 06:36) (113/78 - 165/105)  BP(mean): --  RR: 18 (14 Dec 2024 06:36) (17 - 18)  SpO2: 99% (14 Dec 2024 06:36) (77% - 100%)    Parameters below as of 14 Dec 2024 06:36  Patient On (Oxygen Delivery Method): room air        MEDICATIONS  (STANDING):  acetaminophen     Tablet .. 975 milliGRAM(s) Oral <User Schedule>  diphtheria/tetanus/pertussis (acellular) Vaccine (Adacel) 0.5 milliLiter(s) IntraMuscular once  ibuprofen  Tablet. 600 milliGRAM(s) Oral every 6 hours  lactated ringers. 1000 milliLiter(s) (125 mL/Hr) IV Continuous <Continuous>  oxytocin Infusion 167 milliUNIT(s)/Min (167 mL/Hr) IV Continuous <Continuous>  prenatal multivitamin 1 Tablet(s) Oral daily  sodium chloride 0.9% lock flush 3 milliLiter(s) IV Push every 8 hours      Labs:  Blood type: O Positive  Rubella IgG: RPR: Negative                          8.7[L]   -- >-----------< --    ( 12-14 @ 06:48 )             28.0[L]                        10.4[L]   7.41 >-----------< 189    ( 12-13 @ 06:14 )             34.0[L]    12-13-24 @ 06:14      137  |  104  |  14  ----------------------------<  71  4.0   |  19[L]  |  0.63        Ca    9.3      13 Dec 2024 06:14    TPro  6.3  /  Alb  3.6  /  TBili  0.1[L]  /  DBili  x   /  AST  17  /  ALT  11  /  AlkPhos  252[H]  12-13-24 @ 06:14          Physical Exam:  General: NAD, AOx3  Heart: RRR  Lungs: CTAB, non-labored  Abdomen: soft, non-tender, non-distended, fundus firm  Vaginal: lochia wnl, b/l labial swelling  Extremities: No calf tenderness, erythema or edema.      20y/o PPD#1 from FAVD. Patient is stable and doing well post-operatively with no acute concerns at this time.      #PP  - VSS  - EBL: 400; Hct: 34.0->28.0  - Continue regular diet and PO hydration  - Increase ambulation, OOB and SCD's while in bed  - Continue motrin, tylenol, oxycodone PRN for pain control. Encouraged pt to request PRN pain medication for uncontrolled pain.  - Continue to monitor vitals and symptoms  - Will inquire about birth control at PP follow up appt  - Will continue PP care with Dr. Cameron Louis, PGY-1      
OB Progress Note: FAVD PPD#2    S: 18yo PPD#2 s/p FAVD. Patient feels well. Pain is well controlled. She is tolerating a regular diet and passing flatus. She is voiding spontaneously, and ambulating without difficulty. Endorses light vaginal bleeding, soaking less than 1 pad/hour. Denies headaches/dizziness, fevers/chills. Denies CP/SOB. Denies N/V. Denies vision changes, RUQ pain, worsening edema.    O:  Vitals:  Vital Signs Last 24 Hrs  T(C): 36.4 (15 Dec 2024 05:59), Max: 36.5 (14 Dec 2024 13:02)  T(F): 97.6 (15 Dec 2024 05:59), Max: 97.7 (14 Dec 2024 13:02)  HR: 68 (15 Dec 2024 05:59) (68 - 85)  BP: 126/77 (15 Dec 2024 05:59) (101/64 - 126/84)  BP(mean): --  RR: 18 (15 Dec 2024 05:59) (18 - 18)  SpO2: 99% (15 Dec 2024 05:59) (99% - 100%)    Parameters below as of 15 Dec 2024 05:59  Patient On (Oxygen Delivery Method): room air        MEDICATIONS  (STANDING):  acetaminophen     Tablet .. 975 milliGRAM(s) Oral <User Schedule>  diphtheria/tetanus/pertussis (acellular) Vaccine (Adacel) 0.5 milliLiter(s) IntraMuscular once  ibuprofen  Tablet. 600 milliGRAM(s) Oral every 6 hours  lactated ringers. 1000 milliLiter(s) (125 mL/Hr) IV Continuous <Continuous>  oxytocin Infusion 167 milliUNIT(s)/Min (167 mL/Hr) IV Continuous <Continuous>  prenatal multivitamin 1 Tablet(s) Oral daily  sodium chloride 0.9% lock flush 3 milliLiter(s) IV Push every 8 hours      Labs:  Blood type: O Positive  Rubella IgG: RPR: Negative                          9.0[L]   10.89[H] >-----------< 168    ( 12-14 @ 12:00 )             29.0[L]                        8.7[L]   -- >-----------< --    ( 12-14 @ 06:48 )             28.0[L]                        10.4[L]   7.41 >-----------< 189    ( 12-13 @ 06:14 )             34.0[L]    12-14-24 @ 12:00      137  |  106  |  19  ----------------------------<  70  4.5   |  18[L]  |  0.96    12-13-24 @ 06:14      137  |  104  |  14  ----------------------------<  71  4.0   |  19[L]  |  0.63        Ca    8.8      14 Dec 2024 12:00  Ca    9.3      13 Dec 2024 06:14    TPro  5.7[L]  /  Alb  3.1[L]  /  TBili  0.1[L]  /  DBili  x   /  AST  29  /  ALT  15  /  AlkPhos  190[H]  12-14-24 @ 12:00  TPro  6.3  /  Alb  3.6  /  TBili  0.1[L]  /  DBili  x   /  AST  17  /  ALT  11  /  AlkPhos  252[H]  12-13-24 @ 06:14          Physical Exam:  General: NAD, AOx3  Heart: RRR  Lungs: CTAB, equal breath sounds, non-labored  Abdomen: soft, non-tender, non-distended, fundus firm  Vaginal: lochia wnl  Extremities: No calf tenderness, erythema or edema.

## 2024-12-15 NOTE — PROGRESS NOTE ADULT - ATTENDING COMMENTS
18yo  now PPD#1 s/p low forceps delivery and RML episiotomy for prolonged second stage.  VS notable for normal to mild range blood pressures 110s-150s systolic, UP:C 0.6 on admission, meets criteria for preeclampsia.  Discussed diagnosis of preeclampsia with patient and associated risks.  Last two blood pressures from overnight/this morning within normal limits so will not initiate antihypertensives at this time, but will continue to monitor blood pressures closely and repeat HELLP labs this afternoon (admission HELLP labs yesterday wnl). Postpartum standpoint she is doing well, tolerating PO, sore perineum but pain well controlled, voiding without difficulty, ambulating.  No fundal tenderness or LE edema.  Baby girl is in NICU for r/o sepsis, will follow course. No concern for current maternal infection. Breastfeeding going OK, offered support/LC. Still requiring inpatient management, possible discharge tomorrow pending above course.     Cris Glez MD   PPD # 2  s: pt w/o complaints. tolerating diet  O: vss afebrile  abd fundus firm, non tender  ext no calf tend  pelvic  mild lochia  labs:                          9.0[L]   10.89[H] >-----------< 168    ( 12-14 @ 12:00 )             29.0[L]  a/p  ppd # 2  preeclampsia - pt w/o any toxic symptoms, bp's wnl/stable; HELLP labs wnl   mild anemia - asymptomatic  stable for discharge home today  DR. Rios

## 2024-12-15 NOTE — PROGRESS NOTE ADULT - ASSESSMENT
20y/o PPD#2 from FAVD. Patient is stable and doing well post-partum with no acute concerns at this time.      #PEC  -VSS, BP's 120s/70s-80s  -HELLP labs wnl x2, P/C 0.6  -Asymptomatic    #PP  - EBL: 400; Hct: 34.0->28.0->29.0  - Continue regular diet and PO hydration  -Voiding spontaneously, adequate UO, urinary retention resolved  - Increase ambulation, OOB and SCD's while in bed  - Continue motrin, tylenol, oxycodone PRN for pain control. Encouraged pt to request PRN pain medication for uncontrolled pain.  - Continue to monitor vitals and symptoms  - Will inquire about birth control at PP follow up appt  - Will continue PP care with Dr. Barnes  -Pt states ready for discharge today    Bailey Louis, PGY-1

## 2024-12-17 RX ORDER — .BETA.-CAROTENE, SODIUM ACETATE, ASCORBIC ACID, CHOLECALCIFEROL, .ALPHA.-TOCOPHEROL ACETATE, DL-, THIAMINE MONONITRATE, RIBOFLAVIN, NIACINAMIDE, PYRIDOXINE HYDROCHLORIDE, FOLIC ACID, CYANOCOBALAMIN, CALCIUM CARBONATE, FERROUS FUMARATE, ZINC OXIDE AND CUPRIC OXIDE 2000; 2000; 120; 400; 22; 1.84; 3; 20; 10; 1; 12; 200; 27; 25; 2 [IU]/1; [IU]/1; MG/1; [IU]/1; MG/1; MG/1; MG/1; MG/1; MG/1; MG/1; UG/1; MG/1; MG/1; MG/1; MG/1
1 TABLET ORAL
Refills: 0 | DISCHARGE

## 2025-05-19 ENCOUNTER — APPOINTMENT (OUTPATIENT)
Dept: ANTEPARTUM | Facility: CLINIC | Age: 20
End: 2025-05-19
Payer: MEDICAID

## 2025-05-19 ENCOUNTER — ASOB RESULT (OUTPATIENT)
Age: 20
End: 2025-05-19

## 2025-05-19 PROCEDURE — 76801 OB US < 14 WKS SINGLE FETUS: CPT

## 2025-05-19 PROCEDURE — 76813 OB US NUCHAL MEAS 1 GEST: CPT

## 2025-06-10 ENCOUNTER — ASOB RESULT (OUTPATIENT)
Age: 20
End: 2025-06-10

## 2025-06-10 ENCOUNTER — APPOINTMENT (OUTPATIENT)
Dept: ANTEPARTUM | Facility: CLINIC | Age: 20
End: 2025-06-10
Payer: MEDICAID

## 2025-06-10 PROCEDURE — 76805 OB US >/= 14 WKS SNGL FETUS: CPT

## 2025-07-14 ENCOUNTER — ASOB RESULT (OUTPATIENT)
Age: 20
End: 2025-07-14

## 2025-07-14 ENCOUNTER — APPOINTMENT (OUTPATIENT)
Dept: ANTEPARTUM | Facility: CLINIC | Age: 20
End: 2025-07-14

## 2025-07-14 PROCEDURE — 76816 OB US FOLLOW-UP PER FETUS: CPT
